# Patient Record
Sex: MALE | Race: WHITE | NOT HISPANIC OR LATINO | URBAN - METROPOLITAN AREA
[De-identification: names, ages, dates, MRNs, and addresses within clinical notes are randomized per-mention and may not be internally consistent; named-entity substitution may affect disease eponyms.]

---

## 2017-12-06 ENCOUNTER — HOSPITAL ENCOUNTER (EMERGENCY)
Facility: MEDICAL CENTER | Age: 22
End: 2017-12-06
Attending: EMERGENCY MEDICINE
Payer: MEDICAID

## 2017-12-06 VITALS
HEIGHT: 70 IN | DIASTOLIC BLOOD PRESSURE: 68 MMHG | TEMPERATURE: 99 F | OXYGEN SATURATION: 100 % | WEIGHT: 156.09 LBS | BODY MASS INDEX: 22.35 KG/M2 | HEART RATE: 68 BPM | RESPIRATION RATE: 18 BRPM | SYSTOLIC BLOOD PRESSURE: 145 MMHG

## 2017-12-06 DIAGNOSIS — K08.89 DENTALGIA: ICD-10-CM

## 2017-12-06 PROCEDURE — 700102 HCHG RX REV CODE 250 W/ 637 OVERRIDE(OP): Performed by: EMERGENCY MEDICINE

## 2017-12-06 PROCEDURE — A9270 NON-COVERED ITEM OR SERVICE: HCPCS | Performed by: EMERGENCY MEDICINE

## 2017-12-06 PROCEDURE — 99284 EMERGENCY DEPT VISIT MOD MDM: CPT

## 2017-12-06 RX ORDER — NAPROXEN 500 MG/1
500 TABLET ORAL 2 TIMES DAILY WITH MEALS
Qty: 20 TAB | Refills: 0 | Status: SHIPPED | OUTPATIENT
Start: 2017-12-06

## 2017-12-06 RX ORDER — HYDROCODONE BITARTRATE AND ACETAMINOPHEN 5; 325 MG/1; MG/1
1-2 TABLET ORAL EVERY 4 HOURS PRN
Qty: 7 TAB | Refills: 0 | Status: SHIPPED | OUTPATIENT
Start: 2017-12-06

## 2017-12-06 RX ORDER — PENICILLIN V POTASSIUM 500 MG/1
500 TABLET ORAL EVERY 6 HOURS
Qty: 40 TAB | Refills: 0 | Status: SHIPPED | OUTPATIENT
Start: 2017-12-06

## 2017-12-06 RX ORDER — HYDROCODONE BITARTRATE AND ACETAMINOPHEN 5; 325 MG/1; MG/1
1 TABLET ORAL ONCE
Status: COMPLETED | OUTPATIENT
Start: 2017-12-06 | End: 2017-12-06

## 2017-12-06 RX ORDER — PENICILLIN V POTASSIUM 500 MG/1
500 TABLET ORAL ONCE
Status: COMPLETED | OUTPATIENT
Start: 2017-12-06 | End: 2017-12-06

## 2017-12-06 RX ADMIN — HYDROCODONE BITARTRATE AND ACETAMINOPHEN 1 TABLET: 5; 325 TABLET ORAL at 18:58

## 2017-12-06 RX ADMIN — PENICILLIN V POTASIUM 500 MG: 500 TABLET OROPHARYNGEAL at 18:58

## 2017-12-06 ASSESSMENT — PAIN SCALES - GENERAL
PAINLEVEL_OUTOF10: 10
PAINLEVEL_OUTOF10: 10

## 2017-12-07 NOTE — ED NOTES
Patient given discharge instructions, prescriptions, and follow up recommendations. All questions answered.

## 2017-12-07 NOTE — DISCHARGE INSTRUCTIONS
Dental Abscess  A dental abscess is a collection of pus in or around a tooth.  CAUSES  This condition is caused by a bacterial infection around the root of the tooth that involves the inner part of the tooth (pulp). It may result from:  · Severe tooth decay.  · Trauma to the tooth that allows bacteria to enter into the pulp, such as a broken or chipped tooth.  · Severe gum disease around a tooth.  SYMPTOMS  Symptoms of this condition include:  · Severe pain in and around the infected tooth.  · Swelling and redness around the infected tooth, in the mouth, or in the face.  · Tenderness.  · Pus drainage.  · Bad breath.  · Bitter taste in the mouth.  · Difficulty swallowing.  · Difficulty opening the mouth.  · Nausea.  · Vomiting.  · Chills.  · Swollen neck glands.  · Fever.  DIAGNOSIS  This condition is diagnosed with examination of the infected tooth. During the exam, your dentist may tap on the infected tooth. Your dentist will also ask about your medical and dental history and may order X-rays.  TREATMENT  This condition is treated by eliminating the infection. This may be done with:  · Antibiotic medicine.  · A root canal. This may be performed to save the tooth.  · Pulling (extracting) the tooth. This may also involve draining the abscess. This is done if the tooth cannot be saved.  HOME CARE INSTRUCTIONS  · Take medicines only as directed by your dentist.  · If you were prescribed antibiotic medicine, finish all of it even if you start to feel better.  · Rinse your mouth (gargle) often with salt water to relieve pain or swelling.  · Do not drive or operate heavy machinery while taking pain medicine.  · Do not apply heat to the outside of your mouth.  · Keep all follow-up visits as directed by your dentist. This is important.  SEEK MEDICAL CARE IF:  · Your pain is worse and is not helped by medicine.  SEEK IMMEDIATE MEDICAL CARE IF:  · You have a fever or chills.  · Your symptoms suddenly get worse.  · You have a  very bad headache.  · You have problems breathing or swallowing.  · You have trouble opening your mouth.  · You have swelling in your neck or around your eye.     This information is not intended to replace advice given to you by your health care provider. Make sure you discuss any questions you have with your health care provider.     Document Released: 12/18/2006 Document Revised: 05/03/2016 Document Reviewed: 12/15/2015  [employer] Interactive Patient Education ©2016 [employer]

## 2017-12-07 NOTE — ED NOTES
Patient reports generalized left sided dental pain, reports has dental appt on Friday. Has been taking ASA daily with no relief.

## 2017-12-07 NOTE — ED PROVIDER NOTES
"ED Provider Note    CHIEF COMPLAINT  Chief Complaint   Patient presents with   • Dental Pain     Pt reports to know that he has dental problems/ pt reports increased pain after eating today on left side/ pt denies fever/drainage.   • Mouth Pain       HPI  Stephen Chávez is a 22 y.o. male who presents For evaluation of dental pain, left-sided, presents at this morning. He has a history of dental disease, he may no appointment in 2 days with his dentist but states he cannot tolerate the pain at this time. No fever. He works about 60 hours a week and would like to continue working and receive some better pain control    REVIEW OF SYSTEMS  Negative for fever, neck pain, headache.    PAST MEDICAL HISTORY       SOCIAL HISTORY  Social History     Social History Main Topics   • Smoking status: Not on file   • Smokeless tobacco: Not on file   • Alcohol use Not on file   • Drug use: Unknown   • Sexual activity: Not on file       SURGICAL HISTORY  patient denies any surgical history    CURRENT MEDICATIONS  I personally reviewed the medication list in the charting documentation.     ALLERGIES  No Known Allergies    PHYSICAL EXAM  VITAL SIGNS: BP (!) 173/75   Pulse 61   Temp 37.2 °C (99 °F) (Temporal)   Resp 16   Ht 1.778 m (5' 10\")   Wt 70.8 kg (156 lb 1.4 oz)   SpO2 100%   BMI 22.40 kg/m²   Constitutional: Alert in no apparent distress.  HENT: No obvious facial swelling, multiple dental caries and fillings. No obvious abscess  Eyes: Conjunctiva normal, Non-icteric.   Chest: Normal nonlabored respirations  Skin: No erythema, No rash.   Musculoskeletal: Good range of motion in all major joints.   Neurologic: Alert, No focal deficits noted.   Psychiatric: Affect normal, Judgment normal.    COURSE & MEDICAL DECISION MAKING  Pertinent Labs & Imaging studies reviewed. (See chart for details)    Encounter Summary: This is a 22 y.o. male with dental pain but no drainable abscess on exam, he is not febrile, as appointment in 2 " days with his dentist. I will start him on penicillin, prescribed him a very short course of Norco as well as naproxen, strict return instructions discussed      DISPOSITION: Discharge Home      FINAL IMPRESSION  1. Dentalgia        This dictation was created using voice recognition software. The accuracy of the dictation is limited to the abilities of the software. I expect there may be some errors of grammar and possibly content. The nursing notes were reviewed and certain aspects of this information were incorporated into this note.    Electronically signed by: Gregory Bhat, 12/6/2017 6:58 PM

## 2017-12-07 NOTE — ED NOTES
Chief Complaint   Patient presents with   • Dental Pain     Pt reports to know that he has dental problems/ pt reports increased pain after eating today on left side/ pt denies fever/drainage.   • Mouth Pain     Explained to pt triage process, made pt aware to tell this RN of any changes/concerns, pt verbalized understanding of process and instructions given. Pt to ER lobby.